# Patient Record
Sex: FEMALE | Race: WHITE | NOT HISPANIC OR LATINO | Employment: FULL TIME | URBAN - METROPOLITAN AREA
[De-identification: names, ages, dates, MRNs, and addresses within clinical notes are randomized per-mention and may not be internally consistent; named-entity substitution may affect disease eponyms.]

---

## 2017-01-03 ENCOUNTER — WORRISOME GROWTH - SEE NOTE (OUTPATIENT)
Dept: URBAN - METROPOLITAN AREA CLINIC 31 | Facility: CLINIC | Age: 47
Setting detail: DERMATOLOGY
End: 2017-01-03

## 2017-01-03 PROBLEM — L85.3 XEROSIS CUTIS: Status: RESOLVED | Noted: 2017-01-03

## 2017-01-03 PROBLEM — L82.1 OTHER SEBORRHEIC KERATOSIS: Status: RESOLVED | Noted: 2017-01-03

## 2017-01-03 PROCEDURE — 99213 OFFICE O/P EST LOW 20 MIN: CPT

## 2017-01-03 RX ORDER — DOXYCYCLINE 100 MG/1
CAPSULE ORAL
Qty: 30 | Refills: 1 | Status: DISCONTINUED
Start: 2017-01-03 | End: 2018-02-14

## 2017-01-26 ENCOUNTER — RX ONLY (RX ONLY)
Age: 47
End: 2017-01-26

## 2017-01-26 RX ORDER — SULFACETAMIDE SODIUM, SULFUR 100; 50 MG/G; MG/G
CREAM TOPICAL
Qty: 57 | Refills: 2 | Status: DISCONTINUED
Start: 2017-01-26 | End: 2018-02-14

## 2017-02-10 ENCOUNTER — FOLLOW UP (OUTPATIENT)
Dept: URBAN - METROPOLITAN AREA CLINIC 31 | Facility: CLINIC | Age: 47
Setting detail: DERMATOLOGY
End: 2017-02-10

## 2017-02-10 PROCEDURE — 99213 OFFICE O/P EST LOW 20 MIN: CPT

## 2017-02-10 RX ORDER — METRONIDAZOLE 10 MG/G
1 APPLICATION GEL TOPICAL DAILY
Qty: 60 | Refills: 3
Start: 2017-02-10

## 2017-06-06 ENCOUNTER — RX ONLY (RX ONLY)
Age: 47
End: 2017-06-06

## 2017-06-06 RX ORDER — TAVABOROLE 43.5 MG/ML
SOLUTION TOPICAL
Qty: 10 | Refills: 11 | Status: DISCONTINUED
Start: 2017-06-06 | End: 2018-02-14

## 2017-08-11 ENCOUNTER — RX ONLY (RX ONLY)
Age: 47
End: 2017-08-11

## 2017-08-11 RX ORDER — DOXYCYCLINE 100 MG/1
TABLET, FILM COATED ORAL
Qty: 30 | Refills: 6 | Status: DISCONTINUED
Start: 2017-08-11 | End: 2018-02-14

## 2018-02-14 ENCOUNTER — SKIN CANCER EXAM (OUTPATIENT)
Dept: URBAN - METROPOLITAN AREA CLINIC 31 | Facility: CLINIC | Age: 48
Setting detail: DERMATOLOGY
End: 2018-02-14

## 2018-02-14 PROBLEM — B35.1 TINEA UNGUIUM: Status: RESOLVED | Noted: 2018-02-14

## 2018-02-14 PROBLEM — L82.0 INFLAMED SEBORRHEIC KERATOSIS: Status: RESOLVED | Noted: 2018-02-14

## 2018-02-14 PROCEDURE — 99214 OFFICE O/P EST MOD 30 MIN: CPT

## 2018-02-14 PROCEDURE — 17110 DESTRUCT B9 LESION 1-14: CPT

## 2018-02-14 RX ORDER — TAVABOROLE 43.5 MG/ML
1 ADD'L SIG SOLUTION TOPICAL NIGHTLY
Qty: 10 | Refills: 2
Start: 2018-02-14

## 2018-06-26 ENCOUNTER — RX ONLY (RX ONLY)
Age: 48
End: 2018-06-26

## 2018-06-26 RX ORDER — EFINACONAZOLE 100 MG/ML
1 APPLICATION SOLUTION TOPICAL DAILY
Qty: 4 | Refills: 4
Start: 2018-06-26

## 2019-03-06 ENCOUNTER — SKIN CANCER EXAM (OUTPATIENT)
Dept: URBAN - METROPOLITAN AREA CLINIC 31 | Facility: CLINIC | Age: 49
Setting detail: DERMATOLOGY
End: 2019-03-06

## 2019-03-06 PROBLEM — L71.8 OTHER ROSACEA: Status: RESOLVED | Noted: 2019-03-06

## 2019-03-06 PROBLEM — L82.1 OTHER SEBORRHEIC KERATOSIS: Status: RESOLVED | Noted: 2019-03-06

## 2019-03-06 PROCEDURE — 99214 OFFICE O/P EST MOD 30 MIN: CPT

## 2019-03-06 RX ORDER — BRIMONIDINE TARTRATE 5 MG/G
1 APPLICATION GEL TOPICAL DAILY
Qty: 30 | Refills: 2
Start: 2019-03-06

## 2019-03-06 RX ORDER — EFINACONAZOLE 100 MG/ML
1 APPLICATION SOLUTION TOPICAL DAILY
Qty: 4 | Refills: 3
Start: 2019-03-06

## 2019-03-06 RX ORDER — IVERMECTIN 10 MG/G
1 APPLICATION CREAM TOPICAL NIGHTLY
Qty: 45 | Refills: 2
Start: 2019-03-06

## 2024-09-16 ENCOUNTER — APPOINTMENT (OUTPATIENT)
Dept: URBAN - METROPOLITAN AREA CLINIC 206 | Age: 54
Setting detail: DERMATOLOGY
End: 2024-09-21

## 2024-09-16 DIAGNOSIS — B35.1 TINEA UNGUIUM: ICD-10-CM

## 2024-09-16 DIAGNOSIS — L57.8 OTHER SKIN CHANGES DUE TO CHRONIC EXPOSURE TO NONIONIZING RADIATION: ICD-10-CM

## 2024-09-16 DIAGNOSIS — L82.1 OTHER SEBORRHEIC KERATOSIS: ICD-10-CM

## 2024-09-16 DIAGNOSIS — T07XXXA INSECT BITE, NONVENOMOUS, OF OTHER, MULTIPLE, AND UNSPECIFIED SITES, WITHOUT MENTION OF INFECTION: ICD-10-CM

## 2024-09-16 DIAGNOSIS — D18.0 HEMANGIOMA: ICD-10-CM

## 2024-09-16 DIAGNOSIS — L71.8 OTHER ROSACEA: ICD-10-CM

## 2024-09-16 DIAGNOSIS — D22 MELANOCYTIC NEVI: ICD-10-CM

## 2024-09-16 DIAGNOSIS — Z87.2 PERSONAL HISTORY OF DISEASES OF THE SKIN AND SUBCUTANEOUS TISSUE: ICD-10-CM

## 2024-09-16 PROBLEM — S80.861A INSECT BITE (NONVENOMOUS), RIGHT LOWER LEG, INITIAL ENCOUNTER: Status: ACTIVE | Noted: 2024-09-16

## 2024-09-16 PROBLEM — D18.01 HEMANGIOMA OF SKIN AND SUBCUTANEOUS TISSUE: Status: ACTIVE | Noted: 2024-09-16

## 2024-09-16 PROBLEM — L60.9 NAIL DISORDER, UNSPECIFIED: Status: ACTIVE | Noted: 2024-09-16

## 2024-09-16 PROBLEM — S70.362A INSECT BITE (NONVENOMOUS), LEFT THIGH, INITIAL ENCOUNTER: Status: ACTIVE | Noted: 2024-09-16

## 2024-09-16 PROBLEM — D22.9 MELANOCYTIC NEVI, UNSPECIFIED: Status: ACTIVE | Noted: 2024-09-16

## 2024-09-16 PROCEDURE — OTHER MIPS QUALITY: OTHER

## 2024-09-16 PROCEDURE — OTHER COUNSELING: OTHER

## 2024-09-16 PROCEDURE — OTHER ADDITIONAL NOTES: OTHER

## 2024-09-16 PROCEDURE — OTHER NAIL CLIPPING FOR PAS: OTHER

## 2024-09-16 PROCEDURE — 99213 OFFICE O/P EST LOW 20 MIN: CPT

## 2024-09-16 ASSESSMENT — LOCATION ZONE DERM
LOCATION ZONE: TOENAIL
LOCATION ZONE: LEG
LOCATION ZONE: TRUNK
LOCATION ZONE: FACE

## 2024-09-16 ASSESSMENT — LOCATION SIMPLE DESCRIPTION DERM
LOCATION SIMPLE: LEFT CHEEK
LOCATION SIMPLE: RIGHT PRETIBIAL REGION
LOCATION SIMPLE: RIGHT LOWER BACK
LOCATION SIMPLE: RIGHT 4TH TOE
LOCATION SIMPLE: CHEST
LOCATION SIMPLE: LEFT THIGH

## 2024-09-16 ASSESSMENT — LOCATION DETAILED DESCRIPTION DERM
LOCATION DETAILED: LEFT LATERAL SUPERIOR CHEST
LOCATION DETAILED: LEFT ANTERIOR DISTAL THIGH
LOCATION DETAILED: RIGHT DISTAL PRETIBIAL REGION
LOCATION DETAILED: LEFT INFERIOR CENTRAL MALAR CHEEK
LOCATION DETAILED: RIGHT 4TH TOENAIL
LOCATION DETAILED: UPPER STERNUM
LOCATION DETAILED: RIGHT SUPERIOR LATERAL MIDBACK

## 2024-09-16 NOTE — HPI: FULL BODY SKIN EXAMINATION
How Severe Are Your Spot(S)?: mild
What Is The Reason For Today's Visit?: Full Body Skin Examination
Additional History: \\n\\n-worrisome black spot under right 3 toenail, pcp advised pt to get it evaluated. States that same nail has fungus per our office, has been treated in the past but it will not clear up

## 2024-09-16 NOTE — PROCEDURE: NAIL CLIPPING FOR PAS
Detail Level: Detailed
Render Path Notes In Note?: No
Lab: [id number]
Lab Facility: [id number]
Billing Type: Third-Party Bill

## 2024-09-16 NOTE — PROCEDURE: ADDITIONAL NOTES
Render Risk Assessment In Note?: no
Detail Level: Simple
Additional Notes: A cold pack or cold vegetable bag will help with itch.
Additional Notes: Metrogel 0.75% patient applying once a day the patient was instructed to apply twice a day. And if she needs refills we can refill from her primary doctor.